# Patient Record
Sex: FEMALE | Race: WHITE | NOT HISPANIC OR LATINO | Employment: OTHER | URBAN - NONMETROPOLITAN AREA
[De-identification: names, ages, dates, MRNs, and addresses within clinical notes are randomized per-mention and may not be internally consistent; named-entity substitution may affect disease eponyms.]

---

## 2017-03-06 ENCOUNTER — OFFICE VISIT (OUTPATIENT)
Dept: PULMONOLOGY | Facility: CLINIC | Age: 82
End: 2017-03-06

## 2017-03-06 VITALS
BODY MASS INDEX: 26.84 KG/M2 | OXYGEN SATURATION: 97 % | HEIGHT: 66 IN | WEIGHT: 167 LBS | SYSTOLIC BLOOD PRESSURE: 128 MMHG | RESPIRATION RATE: 18 BRPM | DIASTOLIC BLOOD PRESSURE: 66 MMHG | HEART RATE: 67 BPM

## 2017-03-06 DIAGNOSIS — J42 CHRONIC BRONCHITIS, UNSPECIFIED CHRONIC BRONCHITIS TYPE (HCC): ICD-10-CM

## 2017-03-06 DIAGNOSIS — D83.9 COMMON VARIABLE IMMUNODEFICIENCY (HCC): Primary | ICD-10-CM

## 2017-03-06 PROCEDURE — 99213 OFFICE O/P EST LOW 20 MIN: CPT | Performed by: INTERNAL MEDICINE

## 2017-03-06 NOTE — PROGRESS NOTES
"Chief Complaint   Patient presents with   • Follow-up         Subjective   Latricia Vincent is a 85 y.o. female.     History of Present Illness   Does not report any ongoing issues with shortness of breath, cough or phlegm production.    She denies any recent episodes of bronchitis or pneumonia.    Her symptoms of rheumatoid arthritis and definitely worsened and she is now mostly dependent on wheelchair as her mobility has significantly declined as well.    The following portions of the patient's history were reviewed and updated as appropriate: allergies, current medications, past family history, past medical history, past social history and past surgical history.    Review of Systems   HENT: Negative for sinus pressure, sneezing and sore throat.    Respiratory: Negative for cough, shortness of breath and wheezing.        Objective   Visit Vitals   • /66   • Pulse 67   • Resp 18   • Ht 66\" (167.6 cm)   • Wt 167 lb (75.8 kg)   • SpO2 97%   • BMI 26.95 kg/m2     Physical Exam  Constitutional:          General: No respiratory distress noted. Communication was appropriate.    Eyes:          Extra ocular movement was intact.    Respiratory:          Normal to percussion.          Good Air Entry Bilaterally with minimal scattered crackles heard.    Musculoskeletal:          Used a wheelchair     Extremities:          Changes consistent with rheumatoid arthritis noted.    Neurologic:          Awake, alert and oriented x 3. No focal deficits.           Patient was able to follow simple commands.      Assessment/Plan   Latricia was seen today for follow-up.    Diagnoses and all orders for this visit:    Common variable immunodeficiency    Chronic bronchitis, unspecified chronic bronchitis type         Return in about 6 months (around 9/6/2017) for Recheck.    DISCUSSION (if any):  I had a very long discussion with the patient and [facility]    Her rheumatoid symptoms have clearly worsened and have " asked her to discuss with her rheumatologist the possibility of considering another disease modifying agent.  The last medication causes her to have UTIs.  I've asked her to discuss the risks and benefits with her rheumatologist and with that against continued worsening of her symptoms and the fact that she is already wheelchair bound.      Errors in dictation may reflect use of voice recognition software and not all errors in transcription may have been detected prior to signing    This document was electronically signed by Judson Mcgrath MD March 6, 2017  3:12 PM

## 2017-05-24 ENCOUNTER — LAB REQUISITION (OUTPATIENT)
Dept: LAB | Facility: HOSPITAL | Age: 82
End: 2017-05-24

## 2017-05-24 DIAGNOSIS — E07.9 DISORDER OF THYROID: ICD-10-CM

## 2017-05-24 DIAGNOSIS — I10 ESSENTIAL (PRIMARY) HYPERTENSION: ICD-10-CM

## 2017-05-24 DIAGNOSIS — E78.5 HYPERLIPIDEMIA: ICD-10-CM

## 2017-05-24 LAB
25(OH)D3 SERPL-MCNC: 89 NG/ML
ALBUMIN SERPL-MCNC: 3.9 G/DL (ref 3.4–4.8)
ALBUMIN/GLOB SERPL: 1.1 G/DL (ref 1.5–2.5)
ALP SERPL-CCNC: 85 U/L (ref 35–104)
ALT SERPL W P-5'-P-CCNC: 8 U/L (ref 10–36)
ANION GAP SERPL CALCULATED.3IONS-SCNC: 11 MMOL/L (ref 3.6–11.2)
AST SERPL-CCNC: 22 U/L (ref 10–30)
BASOPHILS # BLD AUTO: 0.03 10*3/MM3 (ref 0–0.3)
BASOPHILS NFR BLD AUTO: 0.3 % (ref 0–2)
BILIRUB SERPL-MCNC: 0.3 MG/DL (ref 0.2–1.8)
BUN BLD-MCNC: 19 MG/DL (ref 7–21)
BUN/CREAT SERPL: 20.9 (ref 7–25)
CALCIUM SPEC-SCNC: 9.2 MG/DL (ref 7.7–10)
CHLORIDE SERPL-SCNC: 104 MMOL/L (ref 99–112)
CHOLEST SERPL-MCNC: 129 MG/DL (ref 0–200)
CO2 SERPL-SCNC: 26 MMOL/L (ref 24.3–31.9)
CREAT BLD-MCNC: 0.91 MG/DL (ref 0.43–1.29)
DEPRECATED RDW RBC AUTO: 47.4 FL (ref 37–54)
EOSINOPHIL # BLD AUTO: 1.05 10*3/MM3 (ref 0–0.7)
EOSINOPHIL NFR BLD AUTO: 9.5 % (ref 0–7)
ERYTHROCYTE [DISTWIDTH] IN BLOOD BY AUTOMATED COUNT: 14.1 % (ref 11.5–14.5)
GFR SERPL CREATININE-BSD FRML MDRD: 59 ML/MIN/1.73
GLOBULIN UR ELPH-MCNC: 3.4 GM/DL
GLUCOSE BLD-MCNC: 92 MG/DL (ref 70–110)
HCT VFR BLD AUTO: 30.4 % (ref 37–47)
HDLC SERPL-MCNC: 41 MG/DL (ref 60–100)
HGB BLD-MCNC: 9.5 G/DL (ref 12–16)
IMM GRANULOCYTES # BLD: 0.07 10*3/MM3 (ref 0–0.03)
IMM GRANULOCYTES NFR BLD: 0.6 % (ref 0–0.5)
LDLC SERPL CALC-MCNC: 68 MG/DL (ref 0–100)
LDLC/HDLC SERPL: 1.65 {RATIO}
LYMPHOCYTES # BLD AUTO: 3.23 10*3/MM3 (ref 1–3)
LYMPHOCYTES NFR BLD AUTO: 29.3 % (ref 16–46)
MCH RBC QN AUTO: 29.1 PG (ref 27–33)
MCHC RBC AUTO-ENTMCNC: 31.3 G/DL (ref 33–37)
MCV RBC AUTO: 93.3 FL (ref 80–94)
MONOCYTES # BLD AUTO: 1.12 10*3/MM3 (ref 0.1–0.9)
MONOCYTES NFR BLD AUTO: 10.2 % (ref 0–12)
NEUTROPHILS # BLD AUTO: 5.51 10*3/MM3 (ref 1.4–6.5)
NEUTROPHILS NFR BLD AUTO: 50.1 % (ref 40–75)
OSMOLALITY SERPL CALC.SUM OF ELEC: 283.2 MOSM/KG (ref 273–305)
PLATELET # BLD AUTO: 483 10*3/MM3 (ref 130–400)
PMV BLD AUTO: 8.6 FL (ref 6–10)
POTASSIUM BLD-SCNC: 4.1 MMOL/L (ref 3.5–5.3)
PROT SERPL-MCNC: 7.3 G/DL (ref 6–8)
RBC # BLD AUTO: 3.26 10*6/MM3 (ref 4.2–5.4)
SODIUM BLD-SCNC: 141 MMOL/L (ref 135–153)
T4 SERPL-MCNC: 7.9 MCG/DL (ref 4.5–10.9)
TRIGL SERPL-MCNC: 102 MG/DL (ref 0–150)
TSH SERPL DL<=0.05 MIU/L-ACNC: 9.21 MIU/ML (ref 0.55–4.78)
VIT B12 BLD-MCNC: 904 PG/ML (ref 211–911)
VLDLC SERPL-MCNC: 20.4 MG/DL
WBC NRBC COR # BLD: 11.01 10*3/MM3 (ref 4.5–12.5)

## 2017-05-24 PROCEDURE — 82306 VITAMIN D 25 HYDROXY: CPT | Performed by: FAMILY MEDICINE

## 2017-05-24 PROCEDURE — 82607 VITAMIN B-12: CPT | Performed by: FAMILY MEDICINE

## 2017-05-24 PROCEDURE — 80053 COMPREHEN METABOLIC PANEL: CPT | Performed by: FAMILY MEDICINE

## 2017-05-24 PROCEDURE — 80061 LIPID PANEL: CPT | Performed by: FAMILY MEDICINE

## 2017-05-24 PROCEDURE — 84436 ASSAY OF TOTAL THYROXINE: CPT | Performed by: FAMILY MEDICINE

## 2017-05-24 PROCEDURE — 84443 ASSAY THYROID STIM HORMONE: CPT | Performed by: FAMILY MEDICINE

## 2017-05-24 PROCEDURE — 85025 COMPLETE CBC W/AUTO DIFF WBC: CPT | Performed by: FAMILY MEDICINE

## 2017-05-26 ENCOUNTER — LAB REQUISITION (OUTPATIENT)
Dept: LAB | Facility: HOSPITAL | Age: 82
End: 2017-05-26

## 2017-05-26 DIAGNOSIS — R30.0 DYSURIA: ICD-10-CM

## 2017-05-26 LAB
BACTERIA UR QL AUTO: ABNORMAL /HPF
BILIRUB UR QL STRIP: NEGATIVE
CLARITY UR: CLEAR
COLOR UR: YELLOW
GLUCOSE UR STRIP-MCNC: NEGATIVE MG/DL
HGB UR QL STRIP.AUTO: NEGATIVE
HYALINE CASTS UR QL AUTO: ABNORMAL /LPF
KETONES UR QL STRIP: NEGATIVE
LEUKOCYTE ESTERASE UR QL STRIP.AUTO: ABNORMAL
NITRITE UR QL STRIP: NEGATIVE
PH UR STRIP.AUTO: 6.5 [PH] (ref 5–8)
PROT UR QL STRIP: NEGATIVE
RBC # UR: ABNORMAL /HPF
REF LAB TEST METHOD: ABNORMAL
SP GR UR STRIP: 1.02 (ref 1–1.03)
SQUAMOUS #/AREA URNS HPF: ABNORMAL /HPF
UROBILINOGEN UR QL STRIP: ABNORMAL
WBC UR QL AUTO: ABNORMAL /HPF

## 2017-05-26 PROCEDURE — 87086 URINE CULTURE/COLONY COUNT: CPT | Performed by: INTERNAL MEDICINE

## 2017-05-26 PROCEDURE — 81001 URINALYSIS AUTO W/SCOPE: CPT | Performed by: INTERNAL MEDICINE

## 2017-05-26 PROCEDURE — 87077 CULTURE AEROBIC IDENTIFY: CPT | Performed by: INTERNAL MEDICINE

## 2017-05-26 PROCEDURE — 87186 SC STD MICRODIL/AGAR DIL: CPT | Performed by: INTERNAL MEDICINE

## 2017-05-29 LAB — BACTERIA SPEC AEROBE CULT: ABNORMAL

## 2017-09-06 ENCOUNTER — OFFICE VISIT (OUTPATIENT)
Dept: PULMONOLOGY | Facility: CLINIC | Age: 82
End: 2017-09-06

## 2017-09-06 VITALS
RESPIRATION RATE: 16 BRPM | OXYGEN SATURATION: 96 % | WEIGHT: 163 LBS | HEART RATE: 68 BPM | DIASTOLIC BLOOD PRESSURE: 52 MMHG | SYSTOLIC BLOOD PRESSURE: 126 MMHG | HEIGHT: 66 IN | BODY MASS INDEX: 26.2 KG/M2

## 2017-09-06 DIAGNOSIS — D83.9 COMMON VARIABLE IMMUNODEFICIENCY (HCC): Primary | ICD-10-CM

## 2017-09-06 DIAGNOSIS — J42 CHRONIC BRONCHITIS, UNSPECIFIED CHRONIC BRONCHITIS TYPE (HCC): ICD-10-CM

## 2017-09-06 PROCEDURE — 99213 OFFICE O/P EST LOW 20 MIN: CPT | Performed by: INTERNAL MEDICINE

## 2017-09-06 NOTE — PROGRESS NOTES
"Chief Complaint   Patient presents with   • Follow-up     Common variable immunodeficiency          Subjective   Latricia Vincent is a 86 y.o. female.     History of Present Illness   Comes in today to follow-up on chronic bronchitis and common variable immunodeficiency.    She denies any recent exacerbations.  She denies any recent episodes of pneumonia.    She continues to use a flutter valve at least 4-5 times a day.  She denies any change or increase in her sputum production.  She is also receiving immunoglobulin replacement therapy every 4 weeks.    The following portions of the patient's history were reviewed and updated as appropriate: allergies, current medications, past family history, past medical history, past social history and past surgical history.    Review of Systems   Constitutional: Positive for fatigue and fever. Negative for chills.   HENT: Positive for sneezing. Negative for congestion, sinus pressure and sore throat.    Respiratory: Negative for cough, chest tightness, shortness of breath and wheezing.        Objective   Visit Vitals   • /52   • Pulse 68   • Resp 16   • Ht 66\" (167.6 cm)   • Wt 163 lb (73.9 kg)   • SpO2 96%   • BMI 26.31 kg/m2       Physical Exam   Constitutional: She is oriented to person, place, and time. She appears well-developed and well-nourished.   HENT:   Head: Atraumatic.   Eyes: EOM are normal.   Neck: Normal range of motion. Neck supple. No JVD present. No tracheal deviation present. No thyromegaly present.   Cardiovascular: Normal rate.    Pulmonary/Chest: Effort normal. She has rales (Right base.).   Musculoskeletal:   Used a walker.   Neurological: She is alert and oriented to person, place, and time.       Assessment/Plan   Latricia was seen today for follow-up.    Diagnoses and all orders for this visit:    Common variable immunodeficiency    Chronic bronchitis, unspecified chronic bronchitis type         Return in about 8 months (around 5/6/2018) for " Recheck.    DISCUSSION (if any):  Continue FleboGamma.    Continue Flutter Valve.      Dictated utilizing Dragon dictation.    This document was electronically signed by Judson Mcgrath MD September 6, 2017  3:08 PM

## 2018-01-01 ENCOUNTER — OFFICE VISIT (OUTPATIENT)
Dept: PULMONOLOGY | Facility: CLINIC | Age: 83
End: 2018-01-01

## 2018-01-01 VITALS
OXYGEN SATURATION: 97 % | RESPIRATION RATE: 18 BRPM | DIASTOLIC BLOOD PRESSURE: 56 MMHG | BODY MASS INDEX: 23.95 KG/M2 | WEIGHT: 149 LBS | HEIGHT: 66 IN | SYSTOLIC BLOOD PRESSURE: 108 MMHG | HEART RATE: 50 BPM

## 2018-01-01 DIAGNOSIS — J42 CHRONIC BRONCHITIS, UNSPECIFIED CHRONIC BRONCHITIS TYPE (HCC): ICD-10-CM

## 2018-01-01 DIAGNOSIS — D83.9 COMMON VARIABLE IMMUNODEFICIENCY (HCC): Primary | ICD-10-CM

## 2018-01-01 PROCEDURE — 99213 OFFICE O/P EST LOW 20 MIN: CPT | Performed by: INTERNAL MEDICINE

## 2018-05-07 ENCOUNTER — OFFICE VISIT (OUTPATIENT)
Dept: PULMONOLOGY | Facility: CLINIC | Age: 83
End: 2018-05-07

## 2018-05-07 VITALS
OXYGEN SATURATION: 96 % | RESPIRATION RATE: 17 BRPM | DIASTOLIC BLOOD PRESSURE: 52 MMHG | WEIGHT: 149 LBS | SYSTOLIC BLOOD PRESSURE: 112 MMHG | BODY MASS INDEX: 23.95 KG/M2 | HEIGHT: 66 IN | HEART RATE: 72 BPM

## 2018-05-07 DIAGNOSIS — D83.9 COMMON VARIABLE IMMUNODEFICIENCY (HCC): Primary | ICD-10-CM

## 2018-05-07 PROCEDURE — 99213 OFFICE O/P EST LOW 20 MIN: CPT | Performed by: INTERNAL MEDICINE

## 2018-05-07 RX ORDER — LUBIPROSTONE 8 UG/1
8 CAPSULE ORAL 2 TIMES DAILY WITH MEALS
COMMUNITY

## 2018-05-07 NOTE — PROGRESS NOTES
"Chief Complaint   Patient presents with   • Follow-up         Subjective   Latricia Vincent is a 86 y.o. female.     History of Present Illness   Patient comes in today to follow-up on common variable immunodeficiency and chronic bronchitis.    The patient denies any recent pneumonias or bronchitis.  She actually uses her flutter valve regularly and also is receiving her IV infusion on a regular basis.    The following portions of the patient's history were reviewed and updated as appropriate: allergies, current medications, past family history, past medical history, past social history and past surgical history.    Review of Systems   Constitutional: Positive for fatigue. Negative for chills and fever.   HENT: Negative for sinus pressure, sneezing and sore throat.    Respiratory: Negative for cough, shortness of breath and wheezing.    Psychiatric/Behavioral: Positive for sleep disturbance.       Objective   Visit Vitals  /52   Pulse 72   Resp 17   Ht 167.6 cm (65.98\")   Wt 67.6 kg (149 lb)   SpO2 96%   BMI 24.06 kg/m²       Physical Exam   Constitutional: She is oriented to person, place, and time. She appears well-developed and well-nourished.   HENT:   Head: Atraumatic.   Eyes: EOM are normal.   Neck: Normal range of motion. Neck supple. No JVD present. No tracheal deviation present. No thyromegaly present.   Pulmonary/Chest: Effort normal. She has rales.   Musculoskeletal:   Used a wheelchair.   Neurological: She is alert and oriented to person, place, and time.       Assessment/Plan   Latricia was seen today for follow-up.    Diagnoses and all orders for this visit:    Common variable immunodeficiency         Return in about 5 months (around 10/7/2018) for Recheck.    DISCUSSION (if any):  I have encouraged her to consider the option of Orencia given by her rheumatologist for her significant rheumatoid arthritis which seems to be worsening.    I told the patient that she does not have underlying COPD and is " receiving infusion for her common variable immunodeficiency which hopefully will prevent any further episodes of pneumonia/bronchitis.    She was provided with a new flutter valve from the office.      Dictated utilizing Dragon dictation.    This document was electronically signed by Judson Mcgrath MD May 7, 2018  3:25 PM

## 2018-10-10 NOTE — PROGRESS NOTES
"Chief Complaint   Patient presents with   • Follow-up     Common variable immunodeficiency          Subjective   Latricia Vincent is a 87 y.o. female.     History of Present Illness   Patient comes in today to follow-up on common variable immunodeficiency and chronic bronchitis.    The patient denies any recent pneumonias or bronchitis.      She actually uses her flutter valve regularly.     She is receiving her IV infusion every 28 days.    The following portions of the patient's history were reviewed and updated as appropriate: allergies, current medications, past family history, past medical history, past social history and past surgical history.    Review of Systems   Constitutional: Negative for chills and fever.   HENT: Negative for sinus pressure and sneezing.    Respiratory: Negative for cough and shortness of breath.        Objective   Visit Vitals  /56   Pulse 50   Resp 18   Ht 167.6 cm (65.98\")   Wt 67.6 kg (149 lb)   SpO2 97%   BMI 24.06 kg/m²       Physical Exam   Constitutional: She is oriented to person, place, and time. She appears well-developed and well-nourished.   HENT:   Head: Atraumatic.   Eyes: EOM are normal.   Neck: Normal range of motion. Neck supple. No JVD present. No tracheal deviation present. No thyromegaly present.   Cardiovascular: Normal rate.    Port in place.    Pulmonary/Chest: Effort normal. She has rales.   Musculoskeletal:   Used a wheelchair.   Neurological: She is alert and oriented to person, place, and time.         Assessment/Plan   Latricia was seen today for follow-up.    Diagnoses and all orders for this visit:    Common variable immunodeficiency (CMS/HCC)    Chronic bronchitis, unspecified chronic bronchitis type (CMS/HCC)           Return in about 6 months (around 4/10/2019) for Recheck, Overbook.    DISCUSSION (if any):  Advise patient to continue using flutter valve regularly.    She will definitely need to continue infusion of immunoglobulins, especially since " she's had a very good response and since she is back on biologic agents for her significant arthritis.    She should be considered at risk for pneumonia and will need to stay up-to-date with vaccination.    The patient is following closely with her primary care provider as far as vaccinations are concerned and was already told to obtain a flu shot.      Dictated utilizing Dragon dictation.    This document was electronically signed by Judson Mcgrath MD October 10, 2018  3:18 PM

## 2019-01-01 ENCOUNTER — OFFICE VISIT (OUTPATIENT)
Dept: PULMONOLOGY | Facility: CLINIC | Age: 84
End: 2019-01-01

## 2019-01-01 VITALS
HEIGHT: 66 IN | DIASTOLIC BLOOD PRESSURE: 60 MMHG | HEART RATE: 73 BPM | BODY MASS INDEX: 23.63 KG/M2 | RESPIRATION RATE: 18 BRPM | SYSTOLIC BLOOD PRESSURE: 110 MMHG | WEIGHT: 147 LBS | OXYGEN SATURATION: 94 %

## 2019-01-01 DIAGNOSIS — J42 CHRONIC BRONCHITIS, UNSPECIFIED CHRONIC BRONCHITIS TYPE (HCC): ICD-10-CM

## 2019-01-01 DIAGNOSIS — R63.4 WEIGHT LOSS: ICD-10-CM

## 2019-01-01 DIAGNOSIS — D83.9 COMMON VARIABLE IMMUNODEFICIENCY (HCC): Primary | ICD-10-CM

## 2019-01-01 PROCEDURE — 99213 OFFICE O/P EST LOW 20 MIN: CPT | Performed by: INTERNAL MEDICINE

## 2019-01-01 RX ORDER — LACTULOSE 10 G/15ML
20 SOLUTION ORAL 2 TIMES DAILY PRN
COMMUNITY

## 2019-01-01 RX ORDER — MEGESTROL ACETATE 40 MG/1
40 TABLET ORAL DAILY
Qty: 30 TABLET | Refills: 2 | Status: SHIPPED | OUTPATIENT
Start: 2019-01-01

## 2019-01-01 RX ORDER — MOMETASONE FUROATE 50 UG/1
2 SPRAY, METERED NASAL DAILY
COMMUNITY

## 2019-04-10 NOTE — PROGRESS NOTES
"Chief Complaint   Patient presents with   • Follow-up     Common variable immunodeficiency        Subjective   Latricia Vincent is a 87 y.o. female.     History of Present Illness   Patient comes in today to follow-up on common variable immunodeficiency and chronic bronchitis.    The patient denies any recent pneumonias or bronchitis.      She actually uses her flutter valve regularly. She is also using Advair regularly.     She is receiving her IV infusion every 28 days.    The following portions of the patient's history were reviewed and updated as appropriate: allergies, current medications, past family history, past medical history, past social history and past surgical history.    Review of Systems   Constitutional: Negative for chills and fever.   HENT: Negative for rhinorrhea and sinus pressure.    Respiratory: Negative for cough and shortness of breath.    Psychiatric/Behavioral: Negative for sleep disturbance.       Objective   Visit Vitals  /60   Pulse 73   Resp 18   Ht 167.6 cm (65.98\")   Wt 66.7 kg (147 lb)   SpO2 94%   BMI 23.74 kg/m²       Physical Exam   Constitutional: She is oriented to person, place, and time. She appears well-developed and well-nourished.   HENT:   Head: Atraumatic.   Eyes: EOM are normal.   Neck: Normal range of motion. Neck supple. No JVD present. No tracheal deviation present. No thyromegaly present.   Cardiovascular: Normal rate.   Port in place.    Pulmonary/Chest: Effort normal. She has rales.   Musculoskeletal:   Significant changes from arthritis noted.    Neurological: She is alert and oriented to person, place, and time.       Assessment/Plan   Latricia was seen today for follow-up.    Diagnoses and all orders for this visit:    Common variable immunodeficiency (CMS/HCC)    Chronic bronchitis, unspecified chronic bronchitis type (CMS/HCC)    Weight loss    Other orders  -     immune globulin, human, (PRIVIGEN) 20 GM/200ML solution infusion; Infuse 25 g into a venous " catheter Every 30 (Thirty) Days.  -     megestrol (MEGACE) 40 MG tablet; Take 1 tablet by mouth Daily.         Return in about 9 months (around 1/10/2020) for Recheck.    DISCUSSION (if any):  The patient is to continue using flutter valve regularly.    She will definitely need to continue infusion of immunoglobulins, especially since she's had a very good response and since she is back on biologic agents for her significant rheumatoid arthritis.    The patient says that she is up-to-date with influenza and pneumonia vaccinations.     Will start Megace 40 mg PO QOD, since she has had poor appetite.     Dictated utilizing Dragon dictation.    This document was electronically signed by Judson Mcgrath MD on 04/10/19 at 3:39 PM